# Patient Record
Sex: MALE | Race: WHITE | Employment: FULL TIME
[De-identification: names, ages, dates, MRNs, and addresses within clinical notes are randomized per-mention and may not be internally consistent; named-entity substitution may affect disease eponyms.]

---

## 2024-01-26 ENCOUNTER — HOSPITAL ENCOUNTER (OUTPATIENT)
Facility: HOSPITAL | Age: 59
Setting detail: RECURRING SERIES
Discharge: HOME | End: 2024-01-29
Payer: OTHER GOVERNMENT

## 2024-01-26 PROCEDURE — 97162 PT EVAL MOD COMPLEX 30 MIN: CPT

## 2024-01-26 PROCEDURE — 97535 SELF CARE MNGMENT TRAINING: CPT

## 2024-01-26 PROCEDURE — 97110 THERAPEUTIC EXERCISES: CPT

## 2024-01-26 NOTE — PROGRESS NOTES
PHYSICAL / OCCUPATIONAL THERAPY - DAILY TREATMENT NOTE    Patient Name: Wilian Camp    Date: 2024    : 1965  Insurance: Payor:  EAST / Plan: [employer] EAST / Product Type: *No Product type* /      Patient  verified Yes     Visit #   Current / Total 1 18   Time   In / Out 8:14 9:00   Pain   In / Out -2/10 1-2/10   Subjective Functional Status/Changes: See Eval/POC.     TREATMENT AREA =  Pain in left knee [M25.562]    OBJECTIVE    23 min [x]Eval - untimed                      Therapeutic Procedures:    Tx Min Billable or 1:1 Min (if diff from Tx Min) Procedure, Rationale, Specifics   15  25855 Therapeutic Exercise (timed):  increase ROM, strength, coordination, balance, and proprioception to improve patient's ability to progress to PLOF and address remaining functional goals. (see flow sheet as applicable)     Details if applicable:  Patient instructed in and briefly performed beginning HEP.  Handouts issued with pictures and written directions for HEP; copies placed in chart.     8  16064 Self Care/Home Management (timed):  improve patient knowledge and understanding of pain reducing techniques, positioning, posture/ergonomics, home safety, activity modification, diagnosis/prognosis, and physical therapy expectations, procedures and progression  to improve patient's ability to progress to PLOF and address remaining functional goals.  (see flow sheet as applicable)     Details if applicable:  Discussed use of ice and elevation, incision dressing, ACE wrap use.  Sterile guaze 4x4 applied over anterolateral left knee incision site and original Xeroform dressing still intact over anteromedial incisite; held secure with sterile 2\" stretch gauze wrap, thenre-applied ACE wrap over left proximal leg/knee/distal thigh.          Details if applicable:            Details if applicable:            Details if applicable:     23  [facility] Totals Reminder: bill using total billable min of TIMED therapeutic

## 2024-01-31 ENCOUNTER — HOSPITAL ENCOUNTER (OUTPATIENT)
Facility: HOSPITAL | Age: 59
Setting detail: RECURRING SERIES
Discharge: HOME | End: 2024-02-03
Payer: OTHER GOVERNMENT

## 2024-01-31 PROCEDURE — 97140 MANUAL THERAPY 1/> REGIONS: CPT

## 2024-01-31 PROCEDURE — 97110 THERAPEUTIC EXERCISES: CPT

## 2024-01-31 NOTE — PROGRESS NOTES
PHYSICAL / OCCUPATIONAL THERAPY - DAILY TREATMENT NOTE    Patient Name: Wilian Camp    Date: 2024    : 1965  Insurance: Payor:  EAST / Plan: [facility] / Product Type: *No Product type* /      Patient  verified Yes     Visit #   Current / Total 2 18   Time   In / Out 740 820   Pain   In / Out 0 0   Subjective Functional Status/Changes: Arrives without bandages on incisions. States that he took it off this morning but keeps it wrapped while sleeping.     TREATMENT AREA =  Pain in left knee [M25.562]    OBJECTIVE    Therapeutic Procedures:    Tx Min Billable or 1:1 Min (if diff from Tx Min) Procedure, Rationale, Specifics   10  73662 Manual Therapy (timed):  decrease pain, increase ROM, and increase tissue extensibility to improve patient's ability to progress to PLOF and address remaining functional goals.  The manual therapy interventions were performed at a separate and distinct time from the therapeutic activities interventions . (see flow sheet as applicable)       Details if applicable:  patella mob, PROM   30  98851 Therapeutic Exercise (timed):  increase ROM, strength, coordination, balance, and proprioception to improve patient's ability to progress to PLOF and address remaining functional goals. (see flow sheet as applicable)       Details if applicable:        44343 Neuromuscular Re-Education (timed):  improve balance, coordination, kinesthetic sense, posture, core stability and proprioception to improve patient's ability to develop conscious control of individual muscles and awareness of position of extremities in order to progress to PLOF and address remaining functional goals. (see flow sheet as applicable)       Details if applicable:        00556 Therapeutic Activity (timed):  use of dynamic activities replicating functional movements to increase ROM, strength, coordination, balance, and proprioception in order to improve patient's ability to progress to PLOF and address

## 2024-02-02 ENCOUNTER — HOSPITAL ENCOUNTER (OUTPATIENT)
Facility: HOSPITAL | Age: 59
Setting detail: RECURRING SERIES
Discharge: HOME | End: 2024-02-05
Payer: OTHER GOVERNMENT

## 2024-02-02 PROCEDURE — 97110 THERAPEUTIC EXERCISES: CPT

## 2024-02-02 PROCEDURE — 97112 NEUROMUSCULAR REEDUCATION: CPT

## 2024-02-02 NOTE — PROGRESS NOTES
= 2 units; 38-52 min = 3 units; 53-67 min = 4 units; 68-82 min = 5 units   Total Total     [x]  Patient Education billed concurrently with other procedures   [x] Review HEP    [] Progressed/Changed HEP, detail:    [] Other detail:       Objective Information/Functional Measures/Assessment    Good flexibility.  Good balance/control with left SLS for T-band resisted right hip motions.  HS tight after T-ball bridging with rolling.       Patient will continue to benefit from skilled PT / OT services to modify and progress therapeutic interventions, analyze and address functional mobility deficits, analyze and address ROM deficits, analyze and address strength deficits, analyze and address soft tissue restrictions, analyze and cue for proper movement patterns, analyze and modify for postural abnormalities, analyze and address imbalance/dizziness, and instruct in home and community integration to address functional deficits and attain remaining goals.     Progress toward goals / Updated goals:  []  See Progress Note/Recertification     Short Term Goals: To be accomplished in 3-4 weeks  Patient independent and compliant with progressive HEP/Self-Care Routine.               Status at IE:  Initiated.   No problems or questions vocalized.  Decrease max pain scale to </= 1/10.               Status at IE:  up to 4/10.   1/10 pre- and post-Tx.  Increase left knee flexion PROM to >/= 135 deg.               Status at IE:  112 deg with discomfort/\"tight\" near/at end range.   Near full motion with self-PROM--standing with foot up on step edge.  Increase bilat HS to >/= -25 deg.               Status at IE:  about -35 deg left and -25 to -30 deg right.   Did well with standing (step) hamstring stretches bilat.     Long Term Goals: To be accomplished in 6-8 weeks  Improve FOTO score to >/= 62.               Status at IE:  49.   Reassess at 30 days/PN.  2. No gait deviations.               Status at IE:  ~WFL yuan with good step

## 2024-02-05 ENCOUNTER — HOSPITAL ENCOUNTER (OUTPATIENT)
Facility: HOSPITAL | Age: 59
Setting detail: RECURRING SERIES
Discharge: HOME | End: 2024-02-08
Payer: OTHER GOVERNMENT

## 2024-02-05 PROCEDURE — 97140 MANUAL THERAPY 1/> REGIONS: CPT

## 2024-02-05 PROCEDURE — 97110 THERAPEUTIC EXERCISES: CPT

## 2024-02-05 NOTE — PROGRESS NOTES
PHYSICAL / OCCUPATIONAL THERAPY - DAILY TREATMENT NOTE    Patient Name: Wilian Camp    Date: 2024    : 1965  Insurance: Payor:  EAST / Plan: [employer] EAST / Product Type: *No Product type* /      Patient  verified Yes     Visit #   Current / Total 4 18   Time   In / Out 740 830   Pain   In / Out 1-2 0   Subjective Functional Status/Changes: Doing well, not doing anything other than the bike at th gym and the exercises I do here.     TREATMENT AREA =  Pain in left knee [M25.562]    OBJECTIVE  Modalities Rationale:     decrease inflammation to improve patient's ability to progress to PLOF and address remaining functional goals.     min [] Estim Unattended, type/location:                                      []  w/ice    []  w/heat    min [] Estim Attended, type/location:                                     []  w/US     []  w/ice    []  w/heat    []  TENS insruct      min []  Mechanical Traction: type/lbs                   []  pro   []  sup   []  int   []  cont    []  before manual    []  after manual    min []  Ultrasound, settings/location:     10 min  unbill [x]  Ice     []  Heat    location/position: Supine (L) knee with bolster    min []  Paraffin,  details:     min []  Vasopneumatic Device, press/temp:     min []  Whirlpool / Fluido:    If using vaso (only need to measure limb vaso being performed on)      pre-treatment girth :       post-treatment girth :       measured at (landmark location) :      min []  Other:    Skin assessment post-treatment:   Intact     Therapeutic Procedures:    Tx Min Billable or 1:1 Min (if diff from Tx Min) Procedure, Rationale, Specifics   10  [id number] Manual Therapy (timed):  decrease pain, increase ROM, and increase tissue extensibility to improve patient's ability to progress to PLOF and address remaining functional goals.  The manual therapy interventions were performed at a separate and distinct time from the therapeutic activities interventions . (see flow

## 2024-02-07 ENCOUNTER — HOSPITAL ENCOUNTER (OUTPATIENT)
Facility: HOSPITAL | Age: 59
Setting detail: RECURRING SERIES
Discharge: HOME | End: 2024-02-10
Payer: OTHER GOVERNMENT

## 2024-02-07 PROCEDURE — 97530 THERAPEUTIC ACTIVITIES: CPT

## 2024-02-07 PROCEDURE — 97110 THERAPEUTIC EXERCISES: CPT

## 2024-02-07 PROCEDURE — 97140 MANUAL THERAPY 1/> REGIONS: CPT

## 2024-02-07 NOTE — PROGRESS NOTES
PHYSICAL / OCCUPATIONAL THERAPY - DAILY TREATMENT NOTE    Patient Name: Wilian Camp    Date: 2024    : 1965  Insurance: Payor:  EAST / Plan: [facility] / Product Type: *No Product type* /      Patient  verified Yes     Visit #   Current / Total 5 18   Time   In / Out 745 825   Pain   In / Out 0 0   Subjective Functional Status/Changes: Talked about the surgery and everything went well and looks good.  Stitches came out.     TREATMENT AREA =  Pain in left knee [M25.562]    OBJECTIVE  Modalities Rationale:     decrease inflammation to improve patient's ability to progress to PLOF and address remaining functional goals.     min [] Estim Unattended, type/location:                                      []  w/ice    []  w/heat    min [] Estim Attended, type/location:                                     []  w/US     []  w/ice    []  w/heat    []  TENS insruct      min []  Mechanical Traction: type/lbs                   []  pro   []  sup   []  int   []  cont    []  before manual    []  after manual    min []  Ultrasound, settings/location:      min  unbill [x]  Ice     []  Heat    location/position: Supine (L) knee with bolster    min []  Paraffin,  details:     min []  Vasopneumatic Device, press/temp:     min []  Whirlpool / Fluido:    If using vaso (only need to measure limb vaso being performed on)      pre-treatment girth :       post-treatment girth :       measured at (landmark location) :      min []  Other:    Skin assessment post-treatment:   Intact     Therapeutic Procedures:    Tx Min Billable or 1:1 Min (if diff from Tx Min) Procedure, Rationale, Specifics   10  17497 Manual Therapy (timed):  decrease pain, increase ROM, and increase tissue extensibility to improve patient's ability to progress to PLOF and address remaining functional goals.  The manual therapy interventions were performed at a separate and distinct time from the therapeutic activities interventions . (see flow sheet as

## 2024-02-12 ENCOUNTER — HOSPITAL ENCOUNTER (OUTPATIENT)
Facility: HOSPITAL | Age: 59
Setting detail: RECURRING SERIES
Discharge: HOME | End: 2024-02-15
Payer: OTHER GOVERNMENT

## 2024-02-12 PROCEDURE — 97110 THERAPEUTIC EXERCISES: CPT

## 2024-02-12 PROCEDURE — 97112 NEUROMUSCULAR REEDUCATION: CPT

## 2024-02-12 PROCEDURE — 97530 THERAPEUTIC ACTIVITIES: CPT

## 2024-02-12 NOTE — PROGRESS NOTES
without pain or railing use.               Status at IE:  Currently stairs up/down one at a time with right LE.              Achieved previously; good SLS mini-squat with band resisted TKE.  5.  Pt able to return to running and without pain.               Status at IE:  not running since onset of symptoms or post-op.              To early post-op to attempt.                 Next PN/ RC due 02/24/2024.  Auth due (visit number/ date) NA.    PLAN  - Continue Plan of Care  - Upgrade activities as tolerated    Martell Joyce PT    2/12/2024    6:59 AM    Future Appointments   Date Time Provider Department Center   2/12/2024  9:00 AM Martell Joyce PT [facility]   2/14/2024  8:20 AM Martell Joyce PT [facility]

## 2024-02-14 ENCOUNTER — APPOINTMENT (OUTPATIENT)
Facility: HOSPITAL | Age: 59
End: 2024-02-14
Payer: OTHER GOVERNMENT

## 2024-03-05 ENCOUNTER — TELEPHONE (OUTPATIENT)
Facility: HOSPITAL | Age: 59
End: 2024-03-05

## 2024-03-05 NOTE — TELEPHONE ENCOUNTER
LVM for pt to cb after not being in therapy for 1 month, f/u call to see if pt would like to be back on schedule